# Patient Record
Sex: FEMALE | Race: WHITE | NOT HISPANIC OR LATINO | URBAN - METROPOLITAN AREA
[De-identification: names, ages, dates, MRNs, and addresses within clinical notes are randomized per-mention and may not be internally consistent; named-entity substitution may affect disease eponyms.]

---

## 2017-03-30 NOTE — PATIENT DISCUSSION
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 RECOMMENDS OMEGA-3 FISH OIL WITH [facility] APPROVAL. RETURN FOR FOLLOW-UP AS SCHEDULED OR SOONER IF SYMPTOMS WORSEN.

## 2017-03-30 NOTE — PATIENT DISCUSSION
Advanced Primary Open Angle Glaucoma Counseling: I have reviewed the regimen of glaucoma drops with the patient and have stressed the importance of compliance. Patient instructed to continue present medication and return for follow-up as scheduled.

## 2017-03-30 NOTE — PATIENT DISCUSSION
*PCF OD; BECOMING VISUALLY SIGNIFICANT BUT NOT BOTHERSOME TO PATIENT AT THIS TIME. CONTINUE TO FOLLOW FOR NOW. OFFER SPEC RX UPDATE.

## 2017-03-30 NOTE — PATIENT DISCUSSION
*POAG, OU: INTRAOCULAR PRESSURE IS WITHIN ACCEPTABLE LIMITS. PATIENT COMPLAINS OF REDNESS AND IRRITATION. STOP BRIMONIDINE TODAY. STRESSED COMPLIANCE WITH CONSISTENT DROP USE. PT INSTRUCTED TO CONTINUE WITH OTHER DROPS AS PRESCRIBED AND RETURN FOR FOLLOW-UP AS SCHEDULED.

## 2017-03-30 NOTE — PATIENT DISCUSSION
Continue: Artificial Tears (dextran 70-hypromellose): drops: 0.1-0.3% 1 drop as needed into both eyes

## 2017-03-30 NOTE — PATIENT DISCUSSION
AMD (DRY), OS:  PRESCRIBE AREDS 2 VITAMINS / AMSLER GRID DAILY / UV PROTECTION. SMOKING CESSATION EMPHASIZED. RETURN FOR FOLLOW-UP AS SCHEDULED.

## 2017-03-30 NOTE — PATIENT DISCUSSION
MACULAR DRUSEN, OD:  PRESCRIBE AREDS 2 VITAMINS AND INCREASE UV PROTECTION. STRESS GOOD DIET AND NO SMOKING. RETURN FOR FOLLOW-UP AS SCHEDULED.

## 2017-04-19 NOTE — PATIENT DISCUSSION
*POAG, OU: INTRAOCULAR PRESSURE IS WITHIN ACCEPTABLE LIMITS. STOPPED BRIMONIDINE DUE TO REDNESS AND IRRITATION. CONTINUE OTHER GLAUCOMA DROPS. STRESSED COMPLIANCE WITH CONSISTENT DROP USE. PT INSTRUCTED TO CONTINUE WITH DROPS AS PRESCRIBED AND RETURN TO SEE DR. ANDREA IN 2 MONTHS.

## 2018-01-09 ENCOUNTER — FOLLOW UP AND POST INJECTION EVALUATION (OUTPATIENT)
Dept: URBAN - METROPOLITAN AREA CLINIC 26 | Facility: CLINIC | Age: 68
End: 2018-01-09

## 2018-01-09 VITALS
SYSTOLIC BLOOD PRESSURE: 138 MMHG | HEIGHT: 64 IN | WEIGHT: 133 LBS | HEART RATE: 85 BPM | BODY MASS INDEX: 22.71 KG/M2 | DIASTOLIC BLOOD PRESSURE: 81 MMHG

## 2018-01-09 DIAGNOSIS — H02.833: ICD-10-CM

## 2018-01-09 DIAGNOSIS — H40.022: ICD-10-CM

## 2018-01-09 DIAGNOSIS — H35.3110: ICD-10-CM

## 2018-01-09 DIAGNOSIS — H02.056: ICD-10-CM

## 2018-01-09 DIAGNOSIS — H35.373: ICD-10-CM

## 2018-01-09 DIAGNOSIS — H43.811: ICD-10-CM

## 2018-01-09 DIAGNOSIS — H35.3221: ICD-10-CM

## 2018-01-09 DIAGNOSIS — H02.836: ICD-10-CM

## 2018-01-09 PROCEDURE — 92014 COMPRE OPH EXAM EST PT 1/>: CPT

## 2018-01-09 PROCEDURE — 92235 FLUORESCEIN ANGRPH MLTIFRAME: CPT

## 2018-01-09 PROCEDURE — 92134 CPTRZ OPH DX IMG PST SGM RTA: CPT

## 2018-01-09 PROCEDURE — 67028 INJECTION EYE DRUG: CPT

## 2018-01-09 ASSESSMENT — TONOMETRY
OS_IOP_MMHG: 12
OD_IOP_MMHG: 12

## 2018-01-09 ASSESSMENT — VISUAL ACUITY
OS_CC: 20/100
OD_CC: 20/20-
OS_PH: 20/40-

## 2018-02-09 ENCOUNTER — CLINIC PROCEDURE ONLY (OUTPATIENT)
Dept: URBAN - METROPOLITAN AREA CLINIC 33 | Facility: CLINIC | Age: 68
End: 2018-02-09

## 2018-02-09 DIAGNOSIS — H35.3221: ICD-10-CM

## 2018-02-09 PROCEDURE — 67028 INJECTION EYE DRUG: CPT

## 2018-02-09 ASSESSMENT — VISUAL ACUITY: OS_SC: 20/30

## 2018-02-09 ASSESSMENT — TONOMETRY: OS_IOP_MMHG: 15

## 2018-03-07 NOTE — PATIENT DISCUSSION
*POAG, OU: INTRAOCULAR PRESSURE IS WITHIN ACCEPTABLE LIMITS. EXPLAINED THE IMPORTANCE OF YEARLY VISUAL BARRIOS AT DR. Celestino Hurst OFFICE. STRESSED COMPLIANCE WITH CONSISTENT DROP USE. PT INSTRUCTED TO CONTINUE WITH DROPS AS PRESCRIBED AND RETURN FOR FOLLOW-UP AS SCHEDULED.

## 2018-03-07 NOTE — PATIENT DISCUSSION
AMD (DRY), OU: PATIENT UNABLE TO TAKE AREDS 2 BECAUSE THE PILLS ARE TOO LARGE TO SWALLOW. I RECOMMEND AMSLER GRID DAILY / UV PROTECTION. SMOKING AVOIDANCE ADVISED. RETURN FOR FOLLOW-UP AS SCHEDULED. RETURN TO DR. JEFFERY ONCE YEARLY AS SCHEDULED.

## 2018-03-16 ENCOUNTER — FOLLOW UP (OUTPATIENT)
Dept: URBAN - METROPOLITAN AREA CLINIC 33 | Facility: CLINIC | Age: 68
End: 2018-03-16

## 2018-03-16 VITALS — HEART RATE: 83 BPM | SYSTOLIC BLOOD PRESSURE: 104 MMHG | HEIGHT: 55 IN | DIASTOLIC BLOOD PRESSURE: 74 MMHG

## 2018-03-16 DIAGNOSIS — H04.123: ICD-10-CM

## 2018-03-16 DIAGNOSIS — H35.3221: ICD-10-CM

## 2018-03-16 DIAGNOSIS — H02.833: ICD-10-CM

## 2018-03-16 DIAGNOSIS — H35.3110: ICD-10-CM

## 2018-03-16 DIAGNOSIS — H02.836: ICD-10-CM

## 2018-03-16 DIAGNOSIS — H40.022: ICD-10-CM

## 2018-03-16 DIAGNOSIS — H43.811: ICD-10-CM

## 2018-03-16 DIAGNOSIS — H35.373: ICD-10-CM

## 2018-03-16 PROCEDURE — 92134 CPTRZ OPH DX IMG PST SGM RTA: CPT

## 2018-03-16 PROCEDURE — 67028 INJECTION EYE DRUG: CPT

## 2018-03-16 PROCEDURE — 92014 COMPRE OPH EXAM EST PT 1/>: CPT

## 2018-03-16 PROCEDURE — 92250 FUNDUS PHOTOGRAPHY W/I&R: CPT

## 2018-03-16 ASSESSMENT — TONOMETRY
OD_IOP_MMHG: 13
OS_IOP_MMHG: 14

## 2018-03-16 ASSESSMENT — VISUAL ACUITY
OS_SC: 20/30
OD_CC: 20/20-1

## 2018-04-19 ENCOUNTER — CLINICAL PROCEDURE AND DIAGNOSTIC TESTING ONLY (OUTPATIENT)
Dept: URBAN - METROPOLITAN AREA CLINIC 26 | Facility: CLINIC | Age: 68
End: 2018-04-19

## 2018-04-19 DIAGNOSIS — H35.3221: ICD-10-CM

## 2018-04-19 DIAGNOSIS — H35.3110: ICD-10-CM

## 2018-04-19 PROCEDURE — 67028 INJECTION EYE DRUG: CPT

## 2018-04-19 PROCEDURE — 92134 CPTRZ OPH DX IMG PST SGM RTA: CPT

## 2018-04-19 ASSESSMENT — VISUAL ACUITY: OS_CC: 20/30-1

## 2018-04-19 ASSESSMENT — TONOMETRY: OS_IOP_MMHG: 12

## 2018-05-25 ENCOUNTER — CLINICAL PROCEDURE AND DIAGNOSTIC TESTING ONLY (OUTPATIENT)
Dept: URBAN - METROPOLITAN AREA CLINIC 33 | Facility: CLINIC | Age: 68
End: 2018-05-25

## 2018-05-25 DIAGNOSIS — H35.3110: ICD-10-CM

## 2018-05-25 DIAGNOSIS — H35.3221: ICD-10-CM

## 2018-05-25 PROCEDURE — 67028 INJECTION EYE DRUG: CPT

## 2018-05-25 PROCEDURE — 92134 CPTRZ OPH DX IMG PST SGM RTA: CPT

## 2018-05-25 ASSESSMENT — TONOMETRY: OS_IOP_MMHG: 12

## 2018-05-25 ASSESSMENT — VISUAL ACUITY: OS_CC: 20/30

## 2018-07-06 ENCOUNTER — CLINICAL PROCEDURE AND DIAGNOSTIC TESTING ONLY (OUTPATIENT)
Dept: URBAN - METROPOLITAN AREA CLINIC 33 | Facility: CLINIC | Age: 68
End: 2018-07-06

## 2018-07-06 DIAGNOSIS — H35.3110: ICD-10-CM

## 2018-07-06 DIAGNOSIS — H35.3221: ICD-10-CM

## 2018-07-06 PROCEDURE — 92134 CPTRZ OPH DX IMG PST SGM RTA: CPT

## 2018-07-06 PROCEDURE — 67028 INJECTION EYE DRUG: CPT

## 2018-07-06 ASSESSMENT — VISUAL ACUITY: OS_CC: 20/30

## 2018-07-06 ASSESSMENT — TONOMETRY: OS_IOP_MMHG: 14

## 2018-08-14 ENCOUNTER — FOLLOW UP AND POST INJECTION EVALUATION (OUTPATIENT)
Dept: URBAN - METROPOLITAN AREA CLINIC 26 | Facility: CLINIC | Age: 68
End: 2018-08-14

## 2018-08-14 VITALS — HEIGHT: 55 IN | DIASTOLIC BLOOD PRESSURE: 70 MMHG | HEART RATE: 72 BPM

## 2018-08-14 DIAGNOSIS — H35.373: ICD-10-CM

## 2018-08-14 DIAGNOSIS — H04.123: ICD-10-CM

## 2018-08-14 DIAGNOSIS — H35.3110: ICD-10-CM

## 2018-08-14 DIAGNOSIS — H02.836: ICD-10-CM

## 2018-08-14 DIAGNOSIS — H35.3221: ICD-10-CM

## 2018-08-14 DIAGNOSIS — H02.833: ICD-10-CM

## 2018-08-14 DIAGNOSIS — H43.811: ICD-10-CM

## 2018-08-14 DIAGNOSIS — H40.022: ICD-10-CM

## 2018-08-14 PROCEDURE — 92014 COMPRE OPH EXAM EST PT 1/>: CPT

## 2018-08-14 PROCEDURE — 92250 FUNDUS PHOTOGRAPHY W/I&R: CPT

## 2018-08-14 PROCEDURE — 67028 INJECTION EYE DRUG: CPT

## 2018-08-14 PROCEDURE — 92134 CPTRZ OPH DX IMG PST SGM RTA: CPT

## 2018-08-14 ASSESSMENT — TONOMETRY
OD_IOP_MMHG: 15
OS_IOP_MMHG: 11

## 2018-08-14 ASSESSMENT — VISUAL ACUITY
OD_SC: 20/20
OS_SC: 20/25-1

## 2018-09-28 ENCOUNTER — FOLLOW UP AND POST INJECTION EVALUATION (OUTPATIENT)
Dept: URBAN - METROPOLITAN AREA CLINIC 33 | Facility: CLINIC | Age: 68
End: 2018-09-28

## 2018-09-28 VITALS — HEIGHT: 55 IN | DIASTOLIC BLOOD PRESSURE: 78 MMHG | SYSTOLIC BLOOD PRESSURE: 142 MMHG | HEART RATE: 76 BPM

## 2018-09-28 DIAGNOSIS — H35.3110: ICD-10-CM

## 2018-09-28 DIAGNOSIS — H43.811: ICD-10-CM

## 2018-09-28 DIAGNOSIS — H35.373: ICD-10-CM

## 2018-09-28 DIAGNOSIS — G45.3: ICD-10-CM

## 2018-09-28 DIAGNOSIS — H02.836: ICD-10-CM

## 2018-09-28 DIAGNOSIS — H02.833: ICD-10-CM

## 2018-09-28 DIAGNOSIS — H40.022: ICD-10-CM

## 2018-09-28 DIAGNOSIS — H04.123: ICD-10-CM

## 2018-09-28 DIAGNOSIS — H35.3221: ICD-10-CM

## 2018-09-28 PROCEDURE — 92014 COMPRE OPH EXAM EST PT 1/>: CPT

## 2018-09-28 PROCEDURE — 92235 FLUORESCEIN ANGRPH MLTIFRAME: CPT

## 2018-09-28 PROCEDURE — 67028 INJECTION EYE DRUG: CPT

## 2018-09-28 PROCEDURE — 92250 FUNDUS PHOTOGRAPHY W/I&R: CPT

## 2018-09-28 ASSESSMENT — TONOMETRY
OS_IOP_MMHG: 12
OD_IOP_MMHG: 12

## 2018-09-28 ASSESSMENT — VISUAL ACUITY
OD_SC: 20/20
OS_SC: 20/30-

## 2018-10-04 ENCOUNTER — ADDENDUM (OUTPATIENT)
Dept: URBAN - METROPOLITAN AREA CLINIC 26 | Facility: CLINIC | Age: 68
End: 2018-10-04

## 2018-11-09 ENCOUNTER — FOLLOW UP AND POST INJECTION EVALUATION (OUTPATIENT)
Dept: URBAN - METROPOLITAN AREA CLINIC 33 | Facility: CLINIC | Age: 68
End: 2018-11-09

## 2018-11-09 DIAGNOSIS — H35.3221: ICD-10-CM

## 2018-11-09 DIAGNOSIS — H35.3110: ICD-10-CM

## 2018-11-09 PROCEDURE — 92134 CPTRZ OPH DX IMG PST SGM RTA: CPT

## 2018-11-09 PROCEDURE — 67028 INJECTION EYE DRUG: CPT

## 2018-11-09 ASSESSMENT — TONOMETRY: OS_IOP_MMHG: 12

## 2018-11-09 ASSESSMENT — VISUAL ACUITY
OS_CC: 20/30-1
OS_SC: 20/40+2

## 2018-12-14 ENCOUNTER — CLINICAL PROCEDURE AND DIAGNOSTIC TESTING ONLY (OUTPATIENT)
Dept: URBAN - METROPOLITAN AREA CLINIC 33 | Facility: CLINIC | Age: 68
End: 2018-12-14

## 2018-12-14 DIAGNOSIS — H35.3110: ICD-10-CM

## 2018-12-14 DIAGNOSIS — H35.3221: ICD-10-CM

## 2018-12-14 PROCEDURE — 92134 CPTRZ OPH DX IMG PST SGM RTA: CPT

## 2018-12-14 PROCEDURE — 67028 INJECTION EYE DRUG: CPT

## 2018-12-14 ASSESSMENT — TONOMETRY: OS_IOP_MMHG: 13

## 2019-01-18 ENCOUNTER — FOLLOW UP AND POST INJECTION EVALUATION (OUTPATIENT)
Dept: URBAN - METROPOLITAN AREA CLINIC 33 | Facility: CLINIC | Age: 69
End: 2019-01-18

## 2019-01-18 VITALS — WEIGHT: 132 LBS | HEIGHT: 64 IN | BODY MASS INDEX: 22.53 KG/M2

## 2019-01-18 DIAGNOSIS — H35.373: ICD-10-CM

## 2019-01-18 DIAGNOSIS — G45.3: ICD-10-CM

## 2019-01-18 DIAGNOSIS — H02.836: ICD-10-CM

## 2019-01-18 DIAGNOSIS — H35.3110: ICD-10-CM

## 2019-01-18 DIAGNOSIS — H40.022: ICD-10-CM

## 2019-01-18 DIAGNOSIS — H35.3221: ICD-10-CM

## 2019-01-18 DIAGNOSIS — H04.123: ICD-10-CM

## 2019-01-18 DIAGNOSIS — H02.833: ICD-10-CM

## 2019-01-18 DIAGNOSIS — H43.811: ICD-10-CM

## 2019-01-18 PROCEDURE — 92014 COMPRE OPH EXAM EST PT 1/>: CPT

## 2019-01-18 PROCEDURE — 67028 INJECTION EYE DRUG: CPT

## 2019-01-18 PROCEDURE — 92250 FUNDUS PHOTOGRAPHY W/I&R: CPT

## 2019-01-18 PROCEDURE — 92134 CPTRZ OPH DX IMG PST SGM RTA: CPT

## 2019-01-18 ASSESSMENT — VISUAL ACUITY
OD_SC: 20/20
OS_SC: 20/30

## 2019-01-18 ASSESSMENT — TONOMETRY
OS_IOP_MMHG: 13
OD_IOP_MMHG: 15

## 2019-03-01 ENCOUNTER — CLINICAL PROCEDURE AND DIAGNOSTIC TESTING ONLY (OUTPATIENT)
Dept: URBAN - METROPOLITAN AREA CLINIC 33 | Facility: CLINIC | Age: 69
End: 2019-03-01

## 2019-03-01 DIAGNOSIS — H35.3221: ICD-10-CM

## 2019-03-01 DIAGNOSIS — H35.3110: ICD-10-CM

## 2019-03-01 PROCEDURE — 67028 INJECTION EYE DRUG: CPT

## 2019-03-01 PROCEDURE — 92134 CPTRZ OPH DX IMG PST SGM RTA: CPT

## 2019-03-01 ASSESSMENT — TONOMETRY: OS_IOP_MMHG: 13

## 2019-03-01 ASSESSMENT — VISUAL ACUITY: OS_SC: 20/30+2

## 2019-03-18 NOTE — PATIENT DISCUSSION
AMD (DRY), OU:  CONTINUE TO SEE DR. JEFFERY REGULARLY. PRESCRIBE AREDS 2 VITAMINS AND RECOMMEND UV PROTECTION. PATIENT IS AWARE OF AMSLER GRID AND HOW TO CHECK FOR PROGRESSION. SMOKING AVOIDANCE ADVISED. RETURN FOR FOLLOW-UP AS SCHEDULED.

## 2019-04-05 ENCOUNTER — CLINICAL PROCEDURE AND DIAGNOSTIC TESTING ONLY (OUTPATIENT)
Dept: URBAN - METROPOLITAN AREA CLINIC 33 | Facility: CLINIC | Age: 69
End: 2019-04-05

## 2019-04-05 DIAGNOSIS — H35.3221: ICD-10-CM

## 2019-04-05 DIAGNOSIS — H35.3110: ICD-10-CM

## 2019-04-05 PROCEDURE — 67028 INJECTION EYE DRUG: CPT

## 2019-04-05 PROCEDURE — 92134 CPTRZ OPH DX IMG PST SGM RTA: CPT

## 2019-04-05 ASSESSMENT — VISUAL ACUITY: OS_SC: 20/30

## 2019-04-05 ASSESSMENT — TONOMETRY: OS_IOP_MMHG: 11

## 2019-05-08 ENCOUNTER — CLINICAL PROCEDURE AND DIAGNOSTIC TESTING ONLY (OUTPATIENT)
Dept: URBAN - METROPOLITAN AREA CLINIC 26 | Facility: CLINIC | Age: 69
End: 2019-05-08

## 2019-05-08 DIAGNOSIS — H35.3110: ICD-10-CM

## 2019-05-08 DIAGNOSIS — H35.3221: ICD-10-CM

## 2019-05-08 PROCEDURE — 67028 INJECTION EYE DRUG: CPT

## 2019-05-08 PROCEDURE — 92134 CPTRZ OPH DX IMG PST SGM RTA: CPT

## 2019-05-08 ASSESSMENT — TONOMETRY: OS_IOP_MMHG: 13

## 2019-05-08 ASSESSMENT — VISUAL ACUITY: OS_SC: 20/30+2

## 2019-06-07 ENCOUNTER — FOLLOW UP (OUTPATIENT)
Dept: URBAN - METROPOLITAN AREA CLINIC 33 | Facility: CLINIC | Age: 69
End: 2019-06-07

## 2019-06-07 DIAGNOSIS — H02.836: ICD-10-CM

## 2019-06-07 DIAGNOSIS — H02.833: ICD-10-CM

## 2019-06-07 DIAGNOSIS — H04.123: ICD-10-CM

## 2019-06-07 DIAGNOSIS — H35.373: ICD-10-CM

## 2019-06-07 DIAGNOSIS — H43.811: ICD-10-CM

## 2019-06-07 DIAGNOSIS — G45.3: ICD-10-CM

## 2019-06-07 DIAGNOSIS — H35.3110: ICD-10-CM

## 2019-06-07 DIAGNOSIS — H40.022: ICD-10-CM

## 2019-06-07 DIAGNOSIS — H35.3221: ICD-10-CM

## 2019-06-07 PROCEDURE — 92250 FUNDUS PHOTOGRAPHY W/I&R: CPT

## 2019-06-07 PROCEDURE — 92014 COMPRE OPH EXAM EST PT 1/>: CPT

## 2019-06-07 PROCEDURE — 67028 INJECTION EYE DRUG: CPT

## 2019-06-07 PROCEDURE — 92134 CPTRZ OPH DX IMG PST SGM RTA: CPT

## 2019-06-07 ASSESSMENT — VISUAL ACUITY
OS_CC: 20/30
OD_CC: 20/20

## 2019-06-07 ASSESSMENT — TONOMETRY
OS_IOP_MMHG: 17
OD_IOP_MMHG: 16

## 2019-07-12 ENCOUNTER — CLINICAL PROCEDURE AND DIAGNOSTIC TESTING ONLY (OUTPATIENT)
Dept: URBAN - METROPOLITAN AREA CLINIC 33 | Facility: CLINIC | Age: 69
End: 2019-07-12

## 2019-07-12 DIAGNOSIS — H35.3110: ICD-10-CM

## 2019-07-12 DIAGNOSIS — H35.3221: ICD-10-CM

## 2019-07-12 PROCEDURE — 67028 INJECTION EYE DRUG: CPT

## 2019-07-12 PROCEDURE — 92134 CPTRZ OPH DX IMG PST SGM RTA: CPT

## 2019-07-12 ASSESSMENT — VISUAL ACUITY: OS_SC: 20/25-1

## 2019-07-12 ASSESSMENT — TONOMETRY: OS_IOP_MMHG: 15

## 2019-08-16 ENCOUNTER — CLINICAL PROCEDURE AND DIAGNOSTIC TESTING ONLY (OUTPATIENT)
Dept: URBAN - METROPOLITAN AREA CLINIC 33 | Facility: CLINIC | Age: 69
End: 2019-08-16

## 2019-08-16 DIAGNOSIS — H35.3110: ICD-10-CM

## 2019-08-16 DIAGNOSIS — H35.3221: ICD-10-CM

## 2019-08-16 PROCEDURE — 67028 INJECTION EYE DRUG: CPT

## 2019-08-16 PROCEDURE — 92134 CPTRZ OPH DX IMG PST SGM RTA: CPT

## 2019-08-16 ASSESSMENT — TONOMETRY: OS_IOP_MMHG: 11

## 2019-08-16 ASSESSMENT — VISUAL ACUITY: OS_SC: 20/25

## 2019-09-20 ENCOUNTER — CLINICAL PROCEDURE AND DIAGNOSTIC TESTING ONLY (OUTPATIENT)
Dept: URBAN - METROPOLITAN AREA CLINIC 33 | Facility: CLINIC | Age: 69
End: 2019-09-20

## 2019-09-20 DIAGNOSIS — H35.3221: ICD-10-CM

## 2019-09-20 DIAGNOSIS — H35.3110: ICD-10-CM

## 2019-09-20 PROCEDURE — 92134 CPTRZ OPH DX IMG PST SGM RTA: CPT

## 2019-09-20 PROCEDURE — 67028 INJECTION EYE DRUG: CPT

## 2019-09-20 ASSESSMENT — TONOMETRY: OS_IOP_MMHG: 13

## 2019-09-20 ASSESSMENT — VISUAL ACUITY: OS_CC: 20/25

## 2019-09-23 NOTE — PATIENT DISCUSSION
Convergence insufficiency : This can be due to many causes. It is often treated with vision therapy. The alignment must be stable prior to proceeding. The cause was discussed with the patient and all their questions were answered.

## 2019-09-23 NOTE — PATIENT DISCUSSION
CONVERGENCE INSUFFICIENCY WITH DIPLOPIA BECOMING SYMPTOMATIC FOR THIS PATIENT. RECOMMEND ADDING FRESNEL PRISM TO GLASSES. RETURN FOR FOLLOW-UP AS SCHEDULED.

## 2019-10-25 ENCOUNTER — FOLLOW UP AND POST INJECTION EVALUATION (OUTPATIENT)
Dept: URBAN - METROPOLITAN AREA CLINIC 33 | Facility: CLINIC | Age: 69
End: 2019-10-25

## 2019-10-25 DIAGNOSIS — H02.836: ICD-10-CM

## 2019-10-25 DIAGNOSIS — H40.022: ICD-10-CM

## 2019-10-25 DIAGNOSIS — H04.123: ICD-10-CM

## 2019-10-25 DIAGNOSIS — H43.811: ICD-10-CM

## 2019-10-25 DIAGNOSIS — H35.3110: ICD-10-CM

## 2019-10-25 DIAGNOSIS — G45.3: ICD-10-CM

## 2019-10-25 DIAGNOSIS — H02.833: ICD-10-CM

## 2019-10-25 DIAGNOSIS — H35.3221: ICD-10-CM

## 2019-10-25 DIAGNOSIS — H35.373: ICD-10-CM

## 2019-10-25 PROCEDURE — 92134 CPTRZ OPH DX IMG PST SGM RTA: CPT

## 2019-10-25 PROCEDURE — 92250 FUNDUS PHOTOGRAPHY W/I&R: CPT

## 2019-10-25 PROCEDURE — 67028 INJECTION EYE DRUG: CPT

## 2019-10-25 PROCEDURE — 92012 INTRM OPH EXAM EST PATIENT: CPT

## 2019-10-25 ASSESSMENT — TONOMETRY
OS_IOP_MMHG: 11
OD_IOP_MMHG: 13

## 2019-10-25 ASSESSMENT — VISUAL ACUITY
OS_SC: 20/25+2
OD_SC: 20/20

## 2019-12-06 ENCOUNTER — CLINICAL PROCEDURE AND DIAGNOSTIC TESTING ONLY (OUTPATIENT)
Dept: URBAN - METROPOLITAN AREA CLINIC 33 | Facility: CLINIC | Age: 69
End: 2019-12-06

## 2019-12-06 DIAGNOSIS — H35.3221: ICD-10-CM

## 2019-12-06 PROCEDURE — 92134 CPTRZ OPH DX IMG PST SGM RTA: CPT

## 2019-12-06 PROCEDURE — 92250 FUNDUS PHOTOGRAPHY W/I&R: CPT

## 2019-12-06 PROCEDURE — 67028 INJECTION EYE DRUG: CPT

## 2019-12-06 ASSESSMENT — VISUAL ACUITY: OS_SC: 20/30-2

## 2019-12-06 ASSESSMENT — TONOMETRY: OS_IOP_MMHG: 12

## 2019-12-20 NOTE — PATIENT DISCUSSION
CONVERGENCE INSUFFICIENCY WITH DIPLOPIA. PATIENT DOES NOT LIKE PRISM EVEN THOUGH IT MAKES HER VA BETTER. OK TO REMOVE FRESNEL PRISM FROM THE GLASSES. RECOMMENDED PATCHING LEFT EYE OR CLOSING LEFT EYE FOR READING SINCE PT IS NOT HAPPY WITH FRESNEL. RETURN FOR FOLLOW-UP AS SCHEDULED.

## 2020-01-10 ENCOUNTER — CLINICAL PROCEDURE AND DIAGNOSTIC TESTING ONLY (OUTPATIENT)
Dept: URBAN - METROPOLITAN AREA CLINIC 33 | Facility: CLINIC | Age: 70
End: 2020-01-10

## 2020-01-10 DIAGNOSIS — H35.373: ICD-10-CM

## 2020-01-10 DIAGNOSIS — H35.3221: ICD-10-CM

## 2020-01-10 PROCEDURE — 92250 FUNDUS PHOTOGRAPHY W/I&R: CPT

## 2020-01-10 PROCEDURE — 92134 CPTRZ OPH DX IMG PST SGM RTA: CPT

## 2020-01-10 PROCEDURE — 67028 INJECTION EYE DRUG: CPT

## 2020-01-10 ASSESSMENT — VISUAL ACUITY: OS_SC: 20/25-2

## 2020-01-10 ASSESSMENT — TONOMETRY: OS_IOP_MMHG: 13

## 2020-02-14 ENCOUNTER — CLINICAL PROCEDURE AND DIAGNOSTIC TESTING ONLY (OUTPATIENT)
Dept: URBAN - METROPOLITAN AREA CLINIC 33 | Facility: CLINIC | Age: 70
End: 2020-02-14

## 2020-02-14 DIAGNOSIS — H35.3110: ICD-10-CM

## 2020-02-14 DIAGNOSIS — H35.3221: ICD-10-CM

## 2020-02-14 PROCEDURE — 67028 INJECTION EYE DRUG: CPT

## 2020-02-14 PROCEDURE — 92134 CPTRZ OPH DX IMG PST SGM RTA: CPT

## 2020-02-14 PROCEDURE — 92250 FUNDUS PHOTOGRAPHY W/I&R: CPT

## 2020-02-14 ASSESSMENT — VISUAL ACUITY: OS_CC: 20/20-2

## 2020-02-14 ASSESSMENT — TONOMETRY: OS_IOP_MMHG: 11

## 2020-03-20 ENCOUNTER — FOLLOW UP AND POST INJECTION EVALUATION (OUTPATIENT)
Dept: URBAN - METROPOLITAN AREA CLINIC 33 | Facility: CLINIC | Age: 70
End: 2020-03-20

## 2020-03-20 VITALS — WEIGHT: 129 LBS | BODY MASS INDEX: 22.02 KG/M2 | HEIGHT: 64 IN

## 2020-03-20 DIAGNOSIS — H02.836: ICD-10-CM

## 2020-03-20 DIAGNOSIS — H43.811: ICD-10-CM

## 2020-03-20 DIAGNOSIS — H35.373: ICD-10-CM

## 2020-03-20 DIAGNOSIS — H35.3110: ICD-10-CM

## 2020-03-20 DIAGNOSIS — H04.123: ICD-10-CM

## 2020-03-20 DIAGNOSIS — G45.3: ICD-10-CM

## 2020-03-20 DIAGNOSIS — H35.3221: ICD-10-CM

## 2020-03-20 DIAGNOSIS — H02.833: ICD-10-CM

## 2020-03-20 DIAGNOSIS — H40.022: ICD-10-CM

## 2020-03-20 PROCEDURE — 67028 INJECTION EYE DRUG: CPT

## 2020-03-20 PROCEDURE — 92134 CPTRZ OPH DX IMG PST SGM RTA: CPT

## 2020-03-20 PROCEDURE — 92014 COMPRE OPH EXAM EST PT 1/>: CPT

## 2020-03-20 PROCEDURE — 92250 FUNDUS PHOTOGRAPHY W/I&R: CPT

## 2020-03-20 ASSESSMENT — VISUAL ACUITY
OS_SC: 20/20
OD_SC: 20/20-1

## 2020-03-20 ASSESSMENT — TONOMETRY
OS_IOP_MMHG: 14
OD_IOP_MMHG: 17

## 2020-04-24 ENCOUNTER — CLINICAL PROCEDURE AND DIAGNOSTIC TESTING ONLY (OUTPATIENT)
Dept: URBAN - METROPOLITAN AREA CLINIC 33 | Facility: CLINIC | Age: 70
End: 2020-04-24

## 2020-04-24 DIAGNOSIS — H35.3221: ICD-10-CM

## 2020-04-24 DIAGNOSIS — H35.373: ICD-10-CM

## 2020-04-24 DIAGNOSIS — H35.3110: ICD-10-CM

## 2020-04-24 PROCEDURE — 67028 INJECTION EYE DRUG: CPT

## 2020-04-24 PROCEDURE — 92134 CPTRZ OPH DX IMG PST SGM RTA: CPT

## 2020-04-24 PROCEDURE — 92250 FUNDUS PHOTOGRAPHY W/I&R: CPT

## 2020-04-24 ASSESSMENT — TONOMETRY: OS_IOP_MMHG: 13

## 2020-04-24 ASSESSMENT — VISUAL ACUITY: OS_SC: 20/20

## 2020-05-29 ENCOUNTER — CLINICAL PROCEDURE AND DIAGNOSTIC TESTING ONLY (OUTPATIENT)
Dept: URBAN - METROPOLITAN AREA CLINIC 33 | Facility: CLINIC | Age: 70
End: 2020-05-29

## 2020-05-29 DIAGNOSIS — H35.3110: ICD-10-CM

## 2020-05-29 DIAGNOSIS — H35.373: ICD-10-CM

## 2020-05-29 DIAGNOSIS — H35.3221: ICD-10-CM

## 2020-05-29 PROCEDURE — 92134 CPTRZ OPH DX IMG PST SGM RTA: CPT

## 2020-05-29 PROCEDURE — 92250 FUNDUS PHOTOGRAPHY W/I&R: CPT

## 2020-05-29 PROCEDURE — 67028 INJECTION EYE DRUG: CPT

## 2020-05-29 ASSESSMENT — TONOMETRY: OS_IOP_MMHG: 13

## 2020-05-29 ASSESSMENT — VISUAL ACUITY: OS_CC: 20/20

## 2020-07-02 ENCOUNTER — CLINICAL PROCEDURE AND DIAGNOSTIC TESTING ONLY (OUTPATIENT)
Dept: URBAN - METROPOLITAN AREA CLINIC 33 | Facility: CLINIC | Age: 70
End: 2020-07-02

## 2020-07-02 DIAGNOSIS — H35.3110: ICD-10-CM

## 2020-07-02 DIAGNOSIS — H35.3221: ICD-10-CM

## 2020-07-02 DIAGNOSIS — H35.373: ICD-10-CM

## 2020-07-02 PROCEDURE — 92134 CPTRZ OPH DX IMG PST SGM RTA: CPT

## 2020-07-02 PROCEDURE — 67028 INJECTION EYE DRUG: CPT

## 2020-07-02 PROCEDURE — 92250 FUNDUS PHOTOGRAPHY W/I&R: CPT

## 2020-07-02 ASSESSMENT — TONOMETRY: OS_IOP_MMHG: 11

## 2020-07-02 ASSESSMENT — VISUAL ACUITY: OS_CC: 20/25

## 2020-08-07 ENCOUNTER — FOLLOW UP AND POST INJECTION EVALUATION (OUTPATIENT)
Dept: URBAN - METROPOLITAN AREA CLINIC 33 | Facility: CLINIC | Age: 70
End: 2020-08-07

## 2020-08-07 DIAGNOSIS — H35.3221: ICD-10-CM

## 2020-08-07 DIAGNOSIS — H43.811: ICD-10-CM

## 2020-08-07 DIAGNOSIS — H35.373: ICD-10-CM

## 2020-08-07 DIAGNOSIS — H02.833: ICD-10-CM

## 2020-08-07 DIAGNOSIS — H35.3110: ICD-10-CM

## 2020-08-07 DIAGNOSIS — H04.123: ICD-10-CM

## 2020-08-07 DIAGNOSIS — H40.022: ICD-10-CM

## 2020-08-07 DIAGNOSIS — G45.3: ICD-10-CM

## 2020-08-07 DIAGNOSIS — H02.836: ICD-10-CM

## 2020-08-07 PROCEDURE — 92014 COMPRE OPH EXAM EST PT 1/>: CPT

## 2020-08-07 PROCEDURE — 67028 INJECTION EYE DRUG: CPT

## 2020-08-07 PROCEDURE — 92134 CPTRZ OPH DX IMG PST SGM RTA: CPT

## 2020-08-07 PROCEDURE — 92250 FUNDUS PHOTOGRAPHY W/I&R: CPT

## 2020-08-07 ASSESSMENT — TONOMETRY
OS_IOP_MMHG: 14
OD_IOP_MMHG: 15

## 2020-08-07 ASSESSMENT — VISUAL ACUITY
OS_CC: 20/20
OD_SC: 20/20+2

## 2020-09-11 ENCOUNTER — CLINICAL PROCEDURE AND DIAGNOSTIC TESTING ONLY (OUTPATIENT)
Dept: URBAN - METROPOLITAN AREA CLINIC 33 | Facility: CLINIC | Age: 70
End: 2020-09-11

## 2020-09-11 DIAGNOSIS — H35.3221: ICD-10-CM

## 2020-09-11 DIAGNOSIS — H35.373: ICD-10-CM

## 2020-09-11 DIAGNOSIS — H35.3110: ICD-10-CM

## 2020-09-11 PROCEDURE — 67028 INJECTION EYE DRUG: CPT

## 2020-09-11 PROCEDURE — 92134 CPTRZ OPH DX IMG PST SGM RTA: CPT

## 2020-09-11 PROCEDURE — 92250 FUNDUS PHOTOGRAPHY W/I&R: CPT

## 2020-09-11 ASSESSMENT — TONOMETRY: OS_IOP_MMHG: 15

## 2020-09-11 ASSESSMENT — VISUAL ACUITY: OS_SC: 20/30-1

## 2020-10-23 ENCOUNTER — CLINICAL PROCEDURE AND DIAGNOSTIC TESTING ONLY (OUTPATIENT)
Dept: URBAN - METROPOLITAN AREA CLINIC 33 | Facility: CLINIC | Age: 70
End: 2020-10-23

## 2020-10-23 DIAGNOSIS — H35.3221: ICD-10-CM

## 2020-10-23 DIAGNOSIS — H35.3110: ICD-10-CM

## 2020-10-23 PROCEDURE — 67028 INJECTION EYE DRUG: CPT

## 2020-10-23 PROCEDURE — 92250 FUNDUS PHOTOGRAPHY W/I&R: CPT

## 2020-10-23 PROCEDURE — 92134 CPTRZ OPH DX IMG PST SGM RTA: CPT

## 2020-10-23 ASSESSMENT — VISUAL ACUITY: OS_CC: 20/25

## 2020-10-23 ASSESSMENT — TONOMETRY: OS_IOP_MMHG: 15

## 2020-12-04 ENCOUNTER — CLINICAL PROCEDURE AND DIAGNOSTIC TESTING ONLY (OUTPATIENT)
Dept: URBAN - METROPOLITAN AREA CLINIC 33 | Facility: CLINIC | Age: 70
End: 2020-12-04

## 2020-12-04 DIAGNOSIS — H35.3110: ICD-10-CM

## 2020-12-04 DIAGNOSIS — H35.373: ICD-10-CM

## 2020-12-04 DIAGNOSIS — H35.3221: ICD-10-CM

## 2020-12-04 PROCEDURE — 92134 CPTRZ OPH DX IMG PST SGM RTA: CPT

## 2020-12-04 PROCEDURE — 67028 INJECTION EYE DRUG: CPT

## 2020-12-04 PROCEDURE — 92250 FUNDUS PHOTOGRAPHY W/I&R: CPT

## 2020-12-04 ASSESSMENT — VISUAL ACUITY: OS_CC: 20/25-2

## 2020-12-04 ASSESSMENT — TONOMETRY: OS_IOP_MMHG: 12

## 2021-01-08 ENCOUNTER — FOLLOW UP AND POST INJECTION EVALUATION (OUTPATIENT)
Dept: URBAN - METROPOLITAN AREA CLINIC 33 | Facility: CLINIC | Age: 71
End: 2021-01-08

## 2021-01-08 VITALS — WEIGHT: 134 LBS | BODY MASS INDEX: 22.88 KG/M2 | HEIGHT: 64 IN

## 2021-01-08 DIAGNOSIS — H35.3110: ICD-10-CM

## 2021-01-08 DIAGNOSIS — H35.3221: ICD-10-CM

## 2021-01-08 DIAGNOSIS — H04.123: ICD-10-CM

## 2021-01-08 DIAGNOSIS — H02.833: ICD-10-CM

## 2021-01-08 DIAGNOSIS — H40.022: ICD-10-CM

## 2021-01-08 DIAGNOSIS — H02.836: ICD-10-CM

## 2021-01-08 DIAGNOSIS — H35.373: ICD-10-CM

## 2021-01-08 DIAGNOSIS — G45.3: ICD-10-CM

## 2021-01-08 DIAGNOSIS — H43.811: ICD-10-CM

## 2021-01-08 DIAGNOSIS — H26.492: ICD-10-CM

## 2021-01-08 PROCEDURE — 92250 FUNDUS PHOTOGRAPHY W/I&R: CPT

## 2021-01-08 PROCEDURE — 92014 COMPRE OPH EXAM EST PT 1/>: CPT

## 2021-01-08 PROCEDURE — 92134 CPTRZ OPH DX IMG PST SGM RTA: CPT

## 2021-01-08 PROCEDURE — 67028 INJECTION EYE DRUG: CPT

## 2021-01-08 ASSESSMENT — TONOMETRY
OS_IOP_MMHG: 16
OD_IOP_MMHG: 16

## 2021-01-08 ASSESSMENT — VISUAL ACUITY
OD_SC: 20/20-1
OS_CC: 20/30

## 2021-02-12 ENCOUNTER — CLINICAL PROCEDURE AND DIAGNOSTIC TESTING ONLY (OUTPATIENT)
Dept: URBAN - METROPOLITAN AREA CLINIC 33 | Facility: CLINIC | Age: 71
End: 2021-02-12

## 2021-02-12 DIAGNOSIS — H35.3221: ICD-10-CM

## 2021-02-12 DIAGNOSIS — H35.3110: ICD-10-CM

## 2021-02-12 DIAGNOSIS — H35.373: ICD-10-CM

## 2021-02-12 PROCEDURE — 92250 FUNDUS PHOTOGRAPHY W/I&R: CPT

## 2021-02-12 PROCEDURE — 67028 INJECTION EYE DRUG: CPT

## 2021-02-12 PROCEDURE — 92134 CPTRZ OPH DX IMG PST SGM RTA: CPT

## 2021-02-12 ASSESSMENT — VISUAL ACUITY: OS_CC: 20/60-2

## 2021-02-12 ASSESSMENT — TONOMETRY: OS_IOP_MMHG: 13

## 2021-03-19 ENCOUNTER — CLINICAL PROCEDURE AND DIAGNOSTIC TESTING ONLY (OUTPATIENT)
Dept: URBAN - METROPOLITAN AREA CLINIC 33 | Facility: CLINIC | Age: 71
End: 2021-03-19

## 2021-03-19 DIAGNOSIS — H35.3110: ICD-10-CM

## 2021-03-19 DIAGNOSIS — H35.373: ICD-10-CM

## 2021-03-19 DIAGNOSIS — H35.3221: ICD-10-CM

## 2021-03-19 PROCEDURE — 67028 INJECTION EYE DRUG: CPT

## 2021-03-19 PROCEDURE — 92250 FUNDUS PHOTOGRAPHY W/I&R: CPT

## 2021-03-19 PROCEDURE — 92134 CPTRZ OPH DX IMG PST SGM RTA: CPT

## 2021-03-19 ASSESSMENT — VISUAL ACUITY: OS_CC: 20/60

## 2021-03-19 ASSESSMENT — TONOMETRY: OS_IOP_MMHG: 14

## 2021-04-23 ENCOUNTER — CLINICAL PROCEDURE AND DIAGNOSTIC TESTING ONLY (OUTPATIENT)
Dept: URBAN - METROPOLITAN AREA CLINIC 33 | Facility: CLINIC | Age: 71
End: 2021-04-23

## 2021-04-23 DIAGNOSIS — H35.373: ICD-10-CM

## 2021-04-23 DIAGNOSIS — H35.3110: ICD-10-CM

## 2021-04-23 DIAGNOSIS — H35.3221: ICD-10-CM

## 2021-04-23 PROCEDURE — 92250 FUNDUS PHOTOGRAPHY W/I&R: CPT

## 2021-04-23 PROCEDURE — 67028 INJECTION EYE DRUG: CPT

## 2021-04-23 PROCEDURE — 92134 CPTRZ OPH DX IMG PST SGM RTA: CPT

## 2021-04-23 ASSESSMENT — VISUAL ACUITY: OS_CC: 20/70-1

## 2021-04-23 ASSESSMENT — TONOMETRY: OS_IOP_MMHG: 18

## 2021-06-04 ENCOUNTER — FOLLOW UP AND POST INJECTION EVALUATION (OUTPATIENT)
Dept: URBAN - METROPOLITAN AREA CLINIC 33 | Facility: CLINIC | Age: 71
End: 2021-06-04

## 2021-06-04 DIAGNOSIS — H35.3110: ICD-10-CM

## 2021-06-04 DIAGNOSIS — H35.373: ICD-10-CM

## 2021-06-04 DIAGNOSIS — H43.811: ICD-10-CM

## 2021-06-04 DIAGNOSIS — G45.3: ICD-10-CM

## 2021-06-04 DIAGNOSIS — H40.022: ICD-10-CM

## 2021-06-04 DIAGNOSIS — H26.492: ICD-10-CM

## 2021-06-04 DIAGNOSIS — H35.3221: ICD-10-CM

## 2021-06-04 PROCEDURE — 67028 INJECTION EYE DRUG: CPT

## 2021-06-04 PROCEDURE — 92014 COMPRE OPH EXAM EST PT 1/>: CPT

## 2021-06-04 PROCEDURE — 92134 CPTRZ OPH DX IMG PST SGM RTA: CPT

## 2021-06-04 PROCEDURE — 92250 FUNDUS PHOTOGRAPHY W/I&R: CPT

## 2021-06-04 ASSESSMENT — VISUAL ACUITY
OD_SC: 20/20
OS_CC: 20/60-2
OS_SC: 20/200

## 2021-06-04 ASSESSMENT — TONOMETRY
OS_IOP_MMHG: 19
OD_IOP_MMHG: 16

## 2021-07-09 ENCOUNTER — CLINICAL PROCEDURE AND DIAGNOSTIC TESTING ONLY (OUTPATIENT)
Dept: URBAN - METROPOLITAN AREA CLINIC 33 | Facility: CLINIC | Age: 71
End: 2021-07-09

## 2021-07-09 DIAGNOSIS — H35.3110: ICD-10-CM

## 2021-07-09 DIAGNOSIS — H35.373: ICD-10-CM

## 2021-07-09 DIAGNOSIS — H35.3221: ICD-10-CM

## 2021-07-09 PROCEDURE — 67028 INJECTION EYE DRUG: CPT

## 2021-07-09 PROCEDURE — 92250 FUNDUS PHOTOGRAPHY W/I&R: CPT

## 2021-07-09 PROCEDURE — 92134 CPTRZ OPH DX IMG PST SGM RTA: CPT

## 2021-07-09 ASSESSMENT — TONOMETRY: OS_IOP_MMHG: 15

## 2021-07-09 ASSESSMENT — VISUAL ACUITY: OS_CC: 20/50-2

## 2021-08-13 ENCOUNTER — CLINICAL PROCEDURE AND DIAGNOSTIC TESTING ONLY (OUTPATIENT)
Dept: URBAN - METROPOLITAN AREA CLINIC 33 | Facility: CLINIC | Age: 71
End: 2021-08-13

## 2021-08-13 DIAGNOSIS — H35.3221: ICD-10-CM

## 2021-08-13 DIAGNOSIS — H35.373: ICD-10-CM

## 2021-08-13 DIAGNOSIS — H35.3110: ICD-10-CM

## 2021-08-13 PROCEDURE — 92134 CPTRZ OPH DX IMG PST SGM RTA: CPT

## 2021-08-13 PROCEDURE — 67028 INJECTION EYE DRUG: CPT

## 2021-08-13 PROCEDURE — 92250 FUNDUS PHOTOGRAPHY W/I&R: CPT

## 2021-08-13 ASSESSMENT — VISUAL ACUITY: OS_CC: 20/60+1

## 2021-08-13 ASSESSMENT — TONOMETRY: OS_IOP_MMHG: 14

## 2021-09-24 ENCOUNTER — CLINICAL PROCEDURE AND DIAGNOSTIC TESTING ONLY (OUTPATIENT)
Dept: URBAN - METROPOLITAN AREA CLINIC 33 | Facility: CLINIC | Age: 71
End: 2021-09-24

## 2021-09-24 DIAGNOSIS — H35.371: ICD-10-CM

## 2021-09-24 DIAGNOSIS — H35.3221: ICD-10-CM

## 2021-09-24 DIAGNOSIS — H35.3110: ICD-10-CM

## 2021-09-24 DIAGNOSIS — H35.372: ICD-10-CM

## 2021-09-24 PROCEDURE — 67028 INJECTION EYE DRUG: CPT

## 2021-09-24 PROCEDURE — 92134 CPTRZ OPH DX IMG PST SGM RTA: CPT

## 2021-09-24 PROCEDURE — 92250 FUNDUS PHOTOGRAPHY W/I&R: CPT

## 2021-09-24 ASSESSMENT — TONOMETRY: OS_IOP_MMHG: 18

## 2021-09-24 ASSESSMENT — VISUAL ACUITY: OS_SC: 20/200

## 2021-11-05 ENCOUNTER — FOLLOW UP AND POST INJECTION EVALUATION (OUTPATIENT)
Dept: URBAN - METROPOLITAN AREA CLINIC 33 | Facility: CLINIC | Age: 71
End: 2021-11-05

## 2021-11-05 DIAGNOSIS — H35.371: ICD-10-CM

## 2021-11-05 DIAGNOSIS — H35.3221: ICD-10-CM

## 2021-11-05 DIAGNOSIS — H43.811: ICD-10-CM

## 2021-11-05 DIAGNOSIS — H04.123: ICD-10-CM

## 2021-11-05 DIAGNOSIS — H40.022: ICD-10-CM

## 2021-11-05 DIAGNOSIS — H35.3110: ICD-10-CM

## 2021-11-05 DIAGNOSIS — G45.3: ICD-10-CM

## 2021-11-05 DIAGNOSIS — H35.372: ICD-10-CM

## 2021-11-05 PROCEDURE — 92134 CPTRZ OPH DX IMG PST SGM RTA: CPT

## 2021-11-05 PROCEDURE — 92250 FUNDUS PHOTOGRAPHY W/I&R: CPT

## 2021-11-05 PROCEDURE — 92014 COMPRE OPH EXAM EST PT 1/>: CPT

## 2021-11-05 PROCEDURE — 67028 INJECTION EYE DRUG: CPT

## 2021-11-05 ASSESSMENT — VISUAL ACUITY
OD_SC: 20/20
OS_SC: 20/400

## 2021-11-05 ASSESSMENT — TONOMETRY
OS_IOP_MMHG: 12
OD_IOP_MMHG: 13

## 2021-12-17 ENCOUNTER — CLINIC PROCEDURE ONLY (OUTPATIENT)
Dept: URBAN - METROPOLITAN AREA CLINIC 33 | Facility: CLINIC | Age: 71
End: 2021-12-17

## 2021-12-17 DIAGNOSIS — H35.3221: ICD-10-CM

## 2021-12-17 DIAGNOSIS — H35.372: ICD-10-CM

## 2021-12-17 DIAGNOSIS — H35.3110: ICD-10-CM

## 2021-12-17 DIAGNOSIS — H35.371: ICD-10-CM

## 2021-12-17 PROCEDURE — 92250 FUNDUS PHOTOGRAPHY W/I&R: CPT

## 2021-12-17 PROCEDURE — 92134 CPTRZ OPH DX IMG PST SGM RTA: CPT

## 2021-12-17 PROCEDURE — 67028 INJECTION EYE DRUG: CPT

## 2021-12-17 ASSESSMENT — VISUAL ACUITY
OS_CC: 20/25-1
OS_SC: 20/30-1

## 2021-12-17 ASSESSMENT — TONOMETRY: OS_IOP_MMHG: 16

## 2022-01-20 ENCOUNTER — CLINIC PROCEDURE ONLY (OUTPATIENT)
Dept: URBAN - METROPOLITAN AREA CLINIC 33 | Facility: CLINIC | Age: 72
End: 2022-01-20

## 2022-01-20 DIAGNOSIS — H35.3221: ICD-10-CM

## 2022-01-20 DIAGNOSIS — H35.371: ICD-10-CM

## 2022-01-20 DIAGNOSIS — H35.372: ICD-10-CM

## 2022-01-20 DIAGNOSIS — H35.3110: ICD-10-CM

## 2022-01-20 PROCEDURE — 67028 INJECTION EYE DRUG: CPT

## 2022-01-20 PROCEDURE — 92134 CPTRZ OPH DX IMG PST SGM RTA: CPT

## 2022-01-20 PROCEDURE — 92250 FUNDUS PHOTOGRAPHY W/I&R: CPT

## 2022-01-20 ASSESSMENT — VISUAL ACUITY: OS_SC: 20/25-1

## 2022-01-20 ASSESSMENT — TONOMETRY: OS_IOP_MMHG: 17

## 2022-03-03 ENCOUNTER — CLINIC PROCEDURE ONLY (OUTPATIENT)
Dept: URBAN - METROPOLITAN AREA CLINIC 33 | Facility: CLINIC | Age: 72
End: 2022-03-03

## 2022-03-03 DIAGNOSIS — H35.371: ICD-10-CM

## 2022-03-03 DIAGNOSIS — H35.372: ICD-10-CM

## 2022-03-03 DIAGNOSIS — H35.3221: ICD-10-CM

## 2022-03-03 DIAGNOSIS — H35.3110: ICD-10-CM

## 2022-03-03 PROCEDURE — 67028 INJECTION EYE DRUG: CPT

## 2022-03-03 PROCEDURE — 92134 CPTRZ OPH DX IMG PST SGM RTA: CPT

## 2022-03-03 PROCEDURE — 92250 FUNDUS PHOTOGRAPHY W/I&R: CPT

## 2022-03-03 ASSESSMENT — TONOMETRY: OS_IOP_MMHG: 16

## 2022-04-07 ENCOUNTER — CLINIC PROCEDURE ONLY (OUTPATIENT)
Dept: URBAN - METROPOLITAN AREA CLINIC 33 | Facility: CLINIC | Age: 72
End: 2022-04-07

## 2022-04-07 DIAGNOSIS — G45.3: ICD-10-CM

## 2022-04-07 DIAGNOSIS — H35.373: ICD-10-CM

## 2022-04-07 DIAGNOSIS — H35.3110: ICD-10-CM

## 2022-04-07 DIAGNOSIS — H35.3221: ICD-10-CM

## 2022-04-07 PROCEDURE — 67028 INJECTION EYE DRUG: CPT

## 2022-04-07 PROCEDURE — 92250 FUNDUS PHOTOGRAPHY W/I&R: CPT

## 2022-04-07 PROCEDURE — 92134 CPTRZ OPH DX IMG PST SGM RTA: CPT

## 2022-04-07 ASSESSMENT — TONOMETRY: OS_IOP_MMHG: 14

## 2022-05-12 ENCOUNTER — FOLLOW UP (OUTPATIENT)
Dept: URBAN - METROPOLITAN AREA CLINIC 33 | Facility: CLINIC | Age: 72
End: 2022-05-12

## 2022-05-12 VITALS — BODY MASS INDEX: 21.68 KG/M2 | WEIGHT: 127 LBS | HEIGHT: 64 IN

## 2022-05-12 DIAGNOSIS — H04.123: ICD-10-CM

## 2022-05-12 DIAGNOSIS — G45.3: ICD-10-CM

## 2022-05-12 DIAGNOSIS — H43.811: ICD-10-CM

## 2022-05-12 DIAGNOSIS — H35.373: ICD-10-CM

## 2022-05-12 DIAGNOSIS — H35.3110: ICD-10-CM

## 2022-05-12 DIAGNOSIS — H40.022: ICD-10-CM

## 2022-05-12 DIAGNOSIS — H35.3221: ICD-10-CM

## 2022-05-12 PROCEDURE — 67028 INJECTION EYE DRUG: CPT

## 2022-05-12 PROCEDURE — 92250 FUNDUS PHOTOGRAPHY W/I&R: CPT

## 2022-05-12 PROCEDURE — 92134 CPTRZ OPH DX IMG PST SGM RTA: CPT

## 2022-05-12 PROCEDURE — 92014 COMPRE OPH EXAM EST PT 1/>: CPT

## 2022-05-12 ASSESSMENT — TONOMETRY
OD_IOP_MMHG: 12
OS_IOP_MMHG: 13

## 2022-05-12 ASSESSMENT — VISUAL ACUITY
OS_SC: 20/20-2
OD_SC: 20/20

## 2022-06-04 ENCOUNTER — TELEPHONE ENCOUNTER (OUTPATIENT)
Dept: URBAN - METROPOLITAN AREA CLINIC 68 | Facility: CLINIC | Age: 72
End: 2022-06-04

## 2022-06-04 RX ORDER — LACTASE 9000 UNIT
TABLET,CHEWABLE ORAL
Qty: 30 | Refills: 0 | OUTPATIENT
Start: 2016-04-08 | End: 2017-06-20

## 2022-06-04 RX ORDER — CHOLESTYRAMINE 4 G/9G
POWDER, FOR SUSPENSION ORAL DAILY
Qty: 30 | Refills: 0 | OUTPATIENT
Start: 2017-06-20 | End: 2017-07-20

## 2022-06-04 RX ORDER — RIFAXIMIN 550 MG/1
TABLET ORAL
Qty: 42 | Refills: 0 | OUTPATIENT
Start: 2016-05-02 | End: 2016-05-27

## 2022-06-05 ENCOUNTER — TELEPHONE ENCOUNTER (OUTPATIENT)
Dept: URBAN - METROPOLITAN AREA CLINIC 68 | Facility: CLINIC | Age: 72
End: 2022-06-05

## 2022-06-05 RX ORDER — RANIBIZUMAB 10 MG/ML
LUCENTIS( 0.5MG/0.05ML INTRAOCULAR  ONCE A MONTH ) ACTIVE -HX ENTRY INJECTION, SOLUTION INTRAVITREAL
Status: ACTIVE | COMMUNITY
Start: 2017-06-27

## 2022-06-05 RX ORDER — LEVOTHYROXINE SODIUM 100 UG/1
SYNTHROID( 100MCG ORAL  DAILY ) ACTIVE -HX ENTRY TABLET ORAL DAILY
Status: ACTIVE | COMMUNITY
Start: 2017-06-27

## 2022-06-05 RX ORDER — SACCHAROMYCES BOULARDII 250 MG
CAPSULE ORAL DAILY
Qty: 1 | Refills: 0 | Status: ACTIVE | COMMUNITY
Start: 2016-05-27

## 2022-06-05 RX ORDER — ERGOCALCIFEROL 1.25 MG/1
VITAMIN D (ERGOCALCIFEROL)( 50000UNIT ORAL  ONCE A WEEK ) ACTIVE -HX ENTRY CAPSULE ORAL
Status: ACTIVE | COMMUNITY
Start: 2017-06-27

## 2022-06-14 ENCOUNTER — CLINIC PROCEDURE ONLY (OUTPATIENT)
Dept: URBAN - METROPOLITAN AREA CLINIC 33 | Facility: CLINIC | Age: 72
End: 2022-06-14

## 2022-06-14 DIAGNOSIS — H35.3110: ICD-10-CM

## 2022-06-14 DIAGNOSIS — H35.3221: ICD-10-CM

## 2022-06-14 PROCEDURE — 67028 INJECTION EYE DRUG: CPT

## 2022-06-14 PROCEDURE — 92250 FUNDUS PHOTOGRAPHY W/I&R: CPT

## 2022-06-14 PROCEDURE — 92134 CPTRZ OPH DX IMG PST SGM RTA: CPT

## 2022-06-25 ENCOUNTER — TELEPHONE ENCOUNTER (OUTPATIENT)
Age: 72
End: 2022-06-25

## 2022-06-25 RX ORDER — CHOLESTYRAMINE 4 G/9G
POWDER, FOR SUSPENSION ORAL DAILY
Qty: 30 | Refills: 0 | OUTPATIENT
Start: 2017-06-20 | End: 2017-07-20

## 2022-06-25 RX ORDER — LACTASE 9000 UNIT
TABLET,CHEWABLE ORAL
Qty: 30 | Refills: 0 | OUTPATIENT
Start: 2016-04-08 | End: 2017-06-20

## 2022-06-25 RX ORDER — RIFAXIMIN 550 MG/1
TABLET ORAL
Qty: 42 | Refills: 0 | OUTPATIENT
Start: 2016-05-02 | End: 2016-05-27

## 2022-06-26 ENCOUNTER — TELEPHONE ENCOUNTER (OUTPATIENT)
Age: 72
End: 2022-06-26

## 2022-06-26 RX ORDER — LEVOTHYROXINE SODIUM 100 MCG
SYNTHROID( 100MCG ORAL  DAILY ) ACTIVE -HX ENTRY TABLET ORAL DAILY
Status: ACTIVE | COMMUNITY
Start: 2017-06-27

## 2022-06-26 RX ORDER — SACCHAROMYCES BOULARDII 250 MG
CAPSULE ORAL DAILY
Qty: 1 | Refills: 0 | Status: ACTIVE | COMMUNITY
Start: 2016-05-27

## 2022-06-26 RX ORDER — ERGOCALCIFEROL 1.25 MG/1
VITAMIN D (ERGOCALCIFEROL)( 50000UNIT ORAL  ONCE A WEEK ) ACTIVE -HX ENTRY CAPSULE ORAL
Status: ACTIVE | COMMUNITY
Start: 2017-06-27

## 2022-07-21 ENCOUNTER — CLINIC PROCEDURE ONLY (OUTPATIENT)
Dept: URBAN - METROPOLITAN AREA CLINIC 33 | Facility: CLINIC | Age: 72
End: 2022-07-21

## 2022-07-21 DIAGNOSIS — H35.3110: ICD-10-CM

## 2022-07-21 DIAGNOSIS — H35.373: ICD-10-CM

## 2022-07-21 DIAGNOSIS — H35.3221: ICD-10-CM

## 2022-07-21 PROCEDURE — 92134 CPTRZ OPH DX IMG PST SGM RTA: CPT

## 2022-07-21 PROCEDURE — 67028 INJECTION EYE DRUG: CPT

## 2022-07-21 PROCEDURE — 92250 FUNDUS PHOTOGRAPHY W/I&R: CPT

## 2022-07-21 ASSESSMENT — TONOMETRY: OS_IOP_MMHG: 15

## 2022-08-25 ENCOUNTER — CLINIC PROCEDURE ONLY (OUTPATIENT)
Dept: URBAN - METROPOLITAN AREA CLINIC 33 | Facility: CLINIC | Age: 72
End: 2022-08-25

## 2022-08-25 DIAGNOSIS — H35.373: ICD-10-CM

## 2022-08-25 DIAGNOSIS — H35.3221: ICD-10-CM

## 2022-08-25 DIAGNOSIS — H35.3110: ICD-10-CM

## 2022-08-25 PROCEDURE — 92250 FUNDUS PHOTOGRAPHY W/I&R: CPT

## 2022-08-25 PROCEDURE — 92134 CPTRZ OPH DX IMG PST SGM RTA: CPT

## 2022-08-25 PROCEDURE — 67028 INJECTION EYE DRUG: CPT

## 2022-08-25 ASSESSMENT — TONOMETRY: OS_IOP_MMHG: 14

## 2022-10-13 ENCOUNTER — FOLLOW UP (OUTPATIENT)
Dept: URBAN - METROPOLITAN AREA CLINIC 33 | Facility: CLINIC | Age: 72
End: 2022-10-13

## 2022-10-13 VITALS — HEIGHT: 64 IN | BODY MASS INDEX: 22.02 KG/M2 | WEIGHT: 129 LBS

## 2022-10-13 DIAGNOSIS — H35.373: ICD-10-CM

## 2022-10-13 DIAGNOSIS — H43.811: ICD-10-CM

## 2022-10-13 DIAGNOSIS — H35.3221: ICD-10-CM

## 2022-10-13 DIAGNOSIS — H35.3110: ICD-10-CM

## 2022-10-13 DIAGNOSIS — H04.123: ICD-10-CM

## 2022-10-13 DIAGNOSIS — H40.022: ICD-10-CM

## 2022-10-13 DIAGNOSIS — H26.492: ICD-10-CM

## 2022-10-13 PROCEDURE — 92250 FUNDUS PHOTOGRAPHY W/I&R: CPT

## 2022-10-13 PROCEDURE — 92014 COMPRE OPH EXAM EST PT 1/>: CPT

## 2022-10-13 PROCEDURE — 67028 INJECTION EYE DRUG: CPT

## 2022-10-13 PROCEDURE — 92134 CPTRZ OPH DX IMG PST SGM RTA: CPT

## 2022-10-13 ASSESSMENT — VISUAL ACUITY
OD_SC: 20/30-1
OS_SC: 20/30

## 2022-10-13 ASSESSMENT — TONOMETRY
OS_IOP_MMHG: 11
OD_IOP_MMHG: 12

## 2022-11-17 ENCOUNTER — CLINIC PROCEDURE ONLY (OUTPATIENT)
Dept: URBAN - METROPOLITAN AREA CLINIC 33 | Facility: CLINIC | Age: 72
End: 2022-11-17

## 2022-11-17 DIAGNOSIS — H35.3110: ICD-10-CM

## 2022-11-17 DIAGNOSIS — H35.373: ICD-10-CM

## 2022-11-17 DIAGNOSIS — H35.3221: ICD-10-CM

## 2022-11-17 PROCEDURE — 67028 INJECTION EYE DRUG: CPT

## 2022-11-17 PROCEDURE — 92250 FUNDUS PHOTOGRAPHY W/I&R: CPT

## 2022-11-17 PROCEDURE — 92134 CPTRZ OPH DX IMG PST SGM RTA: CPT

## 2022-11-17 ASSESSMENT — TONOMETRY: OS_IOP_MMHG: 15

## 2022-12-15 ENCOUNTER — CLINIC PROCEDURE ONLY (OUTPATIENT)
Dept: URBAN - METROPOLITAN AREA CLINIC 33 | Facility: CLINIC | Age: 72
End: 2022-12-15

## 2022-12-15 PROCEDURE — 92250 FUNDUS PHOTOGRAPHY W/I&R: CPT

## 2022-12-15 PROCEDURE — 92134 CPTRZ OPH DX IMG PST SGM RTA: CPT

## 2022-12-15 PROCEDURE — 67028 INJECTION EYE DRUG: CPT

## 2022-12-15 ASSESSMENT — TONOMETRY: OS_IOP_MMHG: 13

## 2023-01-19 ENCOUNTER — CLINIC PROCEDURE ONLY (OUTPATIENT)
Dept: URBAN - METROPOLITAN AREA CLINIC 33 | Facility: CLINIC | Age: 73
End: 2023-01-19

## 2023-01-19 DIAGNOSIS — H35.3221: ICD-10-CM

## 2023-01-19 DIAGNOSIS — H35.3110: ICD-10-CM

## 2023-01-19 DIAGNOSIS — H35.373: ICD-10-CM

## 2023-01-19 DIAGNOSIS — G45.3: ICD-10-CM

## 2023-01-19 PROCEDURE — 92250 FUNDUS PHOTOGRAPHY W/I&R: CPT

## 2023-01-19 PROCEDURE — 92134 CPTRZ OPH DX IMG PST SGM RTA: CPT

## 2023-01-19 PROCEDURE — 67028 INJECTION EYE DRUG: CPT

## 2023-01-19 ASSESSMENT — TONOMETRY: OS_IOP_MMHG: 18

## 2023-04-27 ENCOUNTER — FOLLOW UP (OUTPATIENT)
Dept: URBAN - METROPOLITAN AREA CLINIC 33 | Facility: CLINIC | Age: 73
End: 2023-04-27

## 2023-04-27 DIAGNOSIS — H43.811: ICD-10-CM

## 2023-04-27 DIAGNOSIS — H35.3221: ICD-10-CM

## 2023-04-27 DIAGNOSIS — H40.022: ICD-10-CM

## 2023-04-27 DIAGNOSIS — G45.3: ICD-10-CM

## 2023-04-27 DIAGNOSIS — H04.123: ICD-10-CM

## 2023-04-27 DIAGNOSIS — H35.3110: ICD-10-CM

## 2023-04-27 DIAGNOSIS — H35.373: ICD-10-CM

## 2023-04-27 PROCEDURE — 92014 COMPRE OPH EXAM EST PT 1/>: CPT

## 2023-04-27 PROCEDURE — 92250 FUNDUS PHOTOGRAPHY W/I&R: CPT

## 2023-04-27 PROCEDURE — 92134 CPTRZ OPH DX IMG PST SGM RTA: CPT

## 2023-04-27 PROCEDURE — 67028 INJECTION EYE DRUG: CPT

## 2023-04-27 ASSESSMENT — TONOMETRY
OD_IOP_MMHG: 16
OS_IOP_MMHG: 17

## 2023-04-27 ASSESSMENT — VISUAL ACUITY
OS_SC: 20/20-2
OD_SC: 20/20

## 2023-06-22 ENCOUNTER — CLINIC PROCEDURE ONLY (OUTPATIENT)
Dept: URBAN - METROPOLITAN AREA CLINIC 33 | Facility: CLINIC | Age: 73
End: 2023-06-22

## 2023-06-22 DIAGNOSIS — H35.3221: ICD-10-CM

## 2023-06-22 DIAGNOSIS — H35.3110: ICD-10-CM

## 2023-06-22 DIAGNOSIS — H35.373: ICD-10-CM

## 2023-06-22 PROCEDURE — 92134 CPTRZ OPH DX IMG PST SGM RTA: CPT

## 2023-06-22 PROCEDURE — 92250 FUNDUS PHOTOGRAPHY W/I&R: CPT

## 2023-06-22 ASSESSMENT — TONOMETRY: OS_IOP_MMHG: 15

## 2023-06-29 ENCOUNTER — CLINIC PROCEDURE ONLY (OUTPATIENT)
Dept: URBAN - METROPOLITAN AREA CLINIC 33 | Facility: CLINIC | Age: 73
End: 2023-06-29

## 2023-06-29 DIAGNOSIS — H35.3221: ICD-10-CM

## 2023-06-29 PROCEDURE — 67028 INJECTION EYE DRUG: CPT

## 2023-06-29 ASSESSMENT — TONOMETRY: OS_IOP_MMHG: 20

## 2023-09-07 ENCOUNTER — COMPREHENSIVE EXAM (OUTPATIENT)
Dept: URBAN - METROPOLITAN AREA CLINIC 33 | Facility: CLINIC | Age: 73
End: 2023-09-07

## 2023-09-07 DIAGNOSIS — H35.3110: ICD-10-CM

## 2023-09-07 DIAGNOSIS — H35.373: ICD-10-CM

## 2023-09-07 DIAGNOSIS — H04.123: ICD-10-CM

## 2023-09-07 DIAGNOSIS — H35.3221: ICD-10-CM

## 2023-09-07 DIAGNOSIS — G45.3: ICD-10-CM

## 2023-09-07 DIAGNOSIS — H40.022: ICD-10-CM

## 2023-09-07 DIAGNOSIS — H43.811: ICD-10-CM

## 2023-09-07 PROCEDURE — 67028 INJECTION EYE DRUG: CPT

## 2023-09-07 PROCEDURE — 92014 COMPRE OPH EXAM EST PT 1/>: CPT

## 2023-09-07 PROCEDURE — 92134 CPTRZ OPH DX IMG PST SGM RTA: CPT

## 2023-09-07 PROCEDURE — 92250 FUNDUS PHOTOGRAPHY W/I&R: CPT

## 2023-09-07 ASSESSMENT — VISUAL ACUITY
OS_SC: 20/25
OD_SC: 20/20

## 2023-09-07 ASSESSMENT — TONOMETRY
OD_IOP_MMHG: 14
OS_IOP_MMHG: 15

## 2023-11-30 ENCOUNTER — CLINIC PROCEDURE ONLY (OUTPATIENT)
Dept: URBAN - METROPOLITAN AREA CLINIC 33 | Facility: CLINIC | Age: 73
End: 2023-11-30

## 2023-11-30 DIAGNOSIS — G45.3: ICD-10-CM

## 2023-11-30 DIAGNOSIS — H35.3221: ICD-10-CM

## 2023-11-30 DIAGNOSIS — H35.3110: ICD-10-CM

## 2023-11-30 DIAGNOSIS — H35.373: ICD-10-CM

## 2023-11-30 PROCEDURE — 92134 CPTRZ OPH DX IMG PST SGM RTA: CPT

## 2023-11-30 PROCEDURE — 92250 FUNDUS PHOTOGRAPHY W/I&R: CPT

## 2023-11-30 PROCEDURE — 67028 INJECTION EYE DRUG: CPT

## 2023-11-30 ASSESSMENT — TONOMETRY: OS_IOP_MMHG: 17

## 2024-02-08 ENCOUNTER — CLINIC PROCEDURE ONLY (OUTPATIENT)
Dept: URBAN - METROPOLITAN AREA CLINIC 33 | Facility: CLINIC | Age: 74
End: 2024-02-08

## 2024-02-08 DIAGNOSIS — H35.3221: ICD-10-CM

## 2024-02-08 DIAGNOSIS — G45.3: ICD-10-CM

## 2024-02-08 DIAGNOSIS — H35.3110: ICD-10-CM

## 2024-02-08 PROCEDURE — 92250 FUNDUS PHOTOGRAPHY W/I&R: CPT

## 2024-02-08 PROCEDURE — 67028 INJECTION EYE DRUG: CPT

## 2024-02-08 PROCEDURE — 92134 CPTRZ OPH DX IMG PST SGM RTA: CPT

## 2024-04-18 ENCOUNTER — FOLLOW UP (OUTPATIENT)
Dept: URBAN - METROPOLITAN AREA CLINIC 33 | Facility: CLINIC | Age: 74
End: 2024-04-18

## 2024-04-18 VITALS — WEIGHT: 123 LBS | HEIGHT: 64 IN | BODY MASS INDEX: 21 KG/M2

## 2024-04-18 DIAGNOSIS — H35.3110: ICD-10-CM

## 2024-04-18 DIAGNOSIS — H40.022: ICD-10-CM

## 2024-04-18 DIAGNOSIS — H35.373: ICD-10-CM

## 2024-04-18 DIAGNOSIS — H43.811: ICD-10-CM

## 2024-04-18 DIAGNOSIS — H02.836: ICD-10-CM

## 2024-04-18 DIAGNOSIS — G45.3: ICD-10-CM

## 2024-04-18 DIAGNOSIS — H04.123: ICD-10-CM

## 2024-04-18 DIAGNOSIS — H02.833: ICD-10-CM

## 2024-04-18 DIAGNOSIS — H35.3221: ICD-10-CM

## 2024-04-18 DIAGNOSIS — H26.492: ICD-10-CM

## 2024-04-18 PROCEDURE — 92134 CPTRZ OPH DX IMG PST SGM RTA: CPT

## 2024-04-18 PROCEDURE — 92014 COMPRE OPH EXAM EST PT 1/>: CPT

## 2024-04-18 PROCEDURE — 92250 FUNDUS PHOTOGRAPHY W/I&R: CPT | Mod: 59

## 2024-04-18 PROCEDURE — 67028 INJECTION EYE DRUG: CPT

## 2024-04-18 ASSESSMENT — VISUAL ACUITY
OD_SC: 20/20
OS_CC: 20/30

## 2024-04-18 ASSESSMENT — TONOMETRY
OS_IOP_MMHG: 12
OD_IOP_MMHG: 14

## 2024-07-05 ENCOUNTER — CLINIC PROCEDURE ONLY (OUTPATIENT)
Dept: URBAN - METROPOLITAN AREA CLINIC 33 | Facility: CLINIC | Age: 74
End: 2024-07-05

## 2024-07-05 DIAGNOSIS — H35.3110: ICD-10-CM

## 2024-07-05 DIAGNOSIS — G45.3: ICD-10-CM

## 2024-07-05 DIAGNOSIS — H35.3221: ICD-10-CM

## 2024-07-05 PROCEDURE — 92250 FUNDUS PHOTOGRAPHY W/I&R: CPT | Mod: 59

## 2024-07-05 PROCEDURE — 92134 CPTRZ OPH DX IMG PST SGM RTA: CPT

## 2024-07-05 PROCEDURE — 67028 INJECTION EYE DRUG: CPT

## 2024-07-05 ASSESSMENT — TONOMETRY: OS_IOP_MMHG: 14

## 2024-09-12 ENCOUNTER — CLINIC PROCEDURE ONLY (OUTPATIENT)
Dept: URBAN - METROPOLITAN AREA CLINIC 33 | Facility: CLINIC | Age: 74
End: 2024-09-12

## 2024-09-12 DIAGNOSIS — G45.3: ICD-10-CM

## 2024-09-12 DIAGNOSIS — H35.3221: ICD-10-CM

## 2024-09-12 DIAGNOSIS — H35.3110: ICD-10-CM

## 2024-09-12 PROCEDURE — 92134 CPTRZ OPH DX IMG PST SGM RTA: CPT

## 2024-09-12 PROCEDURE — 67028 INJECTION EYE DRUG: CPT

## 2024-09-12 PROCEDURE — 92250 FUNDUS PHOTOGRAPHY W/I&R: CPT | Mod: 59

## 2024-11-14 ENCOUNTER — CLINIC PROCEDURE ONLY (OUTPATIENT)
Dept: URBAN - METROPOLITAN AREA CLINIC 33 | Facility: CLINIC | Age: 74
End: 2024-11-14

## 2024-11-14 DIAGNOSIS — H35.3221: ICD-10-CM

## 2024-11-14 DIAGNOSIS — H35.3110: ICD-10-CM

## 2024-11-14 DIAGNOSIS — G45.3: ICD-10-CM

## 2024-11-14 PROCEDURE — 92134 CPTRZ OPH DX IMG PST SGM RTA: CPT

## 2024-11-14 PROCEDURE — 67028 INJECTION EYE DRUG: CPT

## 2024-11-14 PROCEDURE — 92250 FUNDUS PHOTOGRAPHY W/I&R: CPT | Mod: 59

## 2024-11-14 PROCEDURE — J2777PFS VABYSMO PFS: Mod: JZ,LT

## 2025-01-09 ENCOUNTER — CLINIC PROCEDURE ONLY (OUTPATIENT)
Age: 75
End: 2025-01-09

## 2025-01-09 DIAGNOSIS — G45.3: ICD-10-CM

## 2025-01-09 DIAGNOSIS — H35.3110: ICD-10-CM

## 2025-01-09 DIAGNOSIS — H35.3221: ICD-10-CM

## 2025-01-09 PROCEDURE — 67028 INJECTION EYE DRUG: CPT

## 2025-01-09 PROCEDURE — J2777PFS VABYSMO PFS: Mod: JZ

## 2025-01-09 PROCEDURE — 92134 CPTRZ OPH DX IMG PST SGM RTA: CPT

## 2025-01-09 PROCEDURE — 92250 FUNDUS PHOTOGRAPHY W/I&R: CPT | Mod: 59

## 2025-03-06 ENCOUNTER — CLINIC PROCEDURE ONLY (OUTPATIENT)
Age: 75
End: 2025-03-06

## 2025-03-06 DIAGNOSIS — H35.3110: ICD-10-CM

## 2025-03-06 DIAGNOSIS — G45.3: ICD-10-CM

## 2025-03-06 DIAGNOSIS — H35.3221: ICD-10-CM

## 2025-03-06 PROCEDURE — 92250 FUNDUS PHOTOGRAPHY W/I&R: CPT | Mod: 59

## 2025-03-06 PROCEDURE — 92134 CPTRZ OPH DX IMG PST SGM RTA: CPT

## 2025-03-06 PROCEDURE — J2777PFS VABYSMO PFS: Mod: JZ

## 2025-03-06 PROCEDURE — 67028 INJECTION EYE DRUG: CPT

## 2025-05-01 ENCOUNTER — COMPREHENSIVE EXAM (OUTPATIENT)
Age: 75
End: 2025-05-01

## 2025-05-01 VITALS — WEIGHT: 127 LBS | HEIGHT: 64 IN | BODY MASS INDEX: 21.68 KG/M2

## 2025-05-01 DIAGNOSIS — H02.836: ICD-10-CM

## 2025-05-01 DIAGNOSIS — H43.811: ICD-10-CM

## 2025-05-01 DIAGNOSIS — G45.3: ICD-10-CM

## 2025-05-01 DIAGNOSIS — H35.3110: ICD-10-CM

## 2025-05-01 DIAGNOSIS — H40.022: ICD-10-CM

## 2025-05-01 DIAGNOSIS — H35.373: ICD-10-CM

## 2025-05-01 DIAGNOSIS — H35.3221: ICD-10-CM

## 2025-05-01 DIAGNOSIS — H26.492: ICD-10-CM

## 2025-05-01 DIAGNOSIS — H02.833: ICD-10-CM

## 2025-05-01 DIAGNOSIS — H04.123: ICD-10-CM

## 2025-05-01 PROCEDURE — 67028 INJECTION EYE DRUG: CPT

## 2025-05-01 PROCEDURE — 92250 FUNDUS PHOTOGRAPHY W/I&R: CPT | Mod: 59

## 2025-05-01 PROCEDURE — 92014 COMPRE OPH EXAM EST PT 1/>: CPT | Mod: 25

## 2025-05-01 PROCEDURE — J2777PFS VABYSMO PFS: Mod: JZ

## 2025-05-01 PROCEDURE — 92134 CPTRZ OPH DX IMG PST SGM RTA: CPT

## 2025-05-16 ENCOUNTER — CONSULTATION/EVALUATION (OUTPATIENT)
Age: 75
End: 2025-05-16

## 2025-05-16 DIAGNOSIS — H02.834: ICD-10-CM

## 2025-05-16 DIAGNOSIS — H57.813: ICD-10-CM

## 2025-05-16 DIAGNOSIS — H02.831: ICD-10-CM

## 2025-05-16 PROCEDURE — 99214 OFFICE O/P EST MOD 30 MIN: CPT

## 2025-05-16 PROCEDURE — 92285 EXTERNAL OCULAR PHOTOGRAPHY: CPT

## 2025-05-16 PROCEDURE — 92081 LIMITED VISUAL FIELD XM: CPT

## 2025-06-03 ENCOUNTER — SURGERY/PROCEDURE (OUTPATIENT)
Age: 75
End: 2025-06-03

## 2025-06-03 DIAGNOSIS — H02.831: ICD-10-CM

## 2025-06-03 DIAGNOSIS — H02.834: ICD-10-CM

## 2025-06-03 PROCEDURE — 1582350 UPPER BLEPH PER EYE FUNCTIONAL-BILATERAL: Mod: 50

## 2025-06-04 ENCOUNTER — POST-OP (OUTPATIENT)
Age: 75
End: 2025-06-04

## 2025-06-04 DIAGNOSIS — Z98.890: ICD-10-CM

## 2025-06-04 PROCEDURE — 99024 POSTOP FOLLOW-UP VISIT: CPT

## 2025-06-11 ENCOUNTER — POST-OP (OUTPATIENT)
Age: 75
End: 2025-06-11

## 2025-06-11 DIAGNOSIS — Z98.890: ICD-10-CM

## 2025-06-11 PROCEDURE — 99024 POSTOP FOLLOW-UP VISIT: CPT

## 2025-06-26 ENCOUNTER — CLINIC PROCEDURE ONLY (OUTPATIENT)
Age: 75
End: 2025-06-26

## 2025-06-26 DIAGNOSIS — G45.3: ICD-10-CM

## 2025-06-26 DIAGNOSIS — H35.3110: ICD-10-CM

## 2025-06-26 DIAGNOSIS — H35.3221: ICD-10-CM

## 2025-06-26 PROCEDURE — 67028 INJECTION EYE DRUG: CPT

## 2025-06-26 PROCEDURE — 92134 CPTRZ OPH DX IMG PST SGM RTA: CPT

## 2025-06-26 PROCEDURE — 92250 FUNDUS PHOTOGRAPHY W/I&R: CPT | Mod: 59

## 2025-06-26 PROCEDURE — J2777PFS VABYSMO PFS: Mod: JZ

## 2025-08-21 ENCOUNTER — CLINIC PROCEDURE ONLY (OUTPATIENT)
Age: 75
End: 2025-08-21

## 2025-08-21 DIAGNOSIS — H35.3221: ICD-10-CM

## 2025-08-21 DIAGNOSIS — H35.3110: ICD-10-CM

## 2025-08-21 DIAGNOSIS — G45.3: ICD-10-CM

## 2025-08-21 PROCEDURE — 92250 FUNDUS PHOTOGRAPHY W/I&R: CPT | Mod: 59

## 2025-08-21 PROCEDURE — 92134 CPTRZ OPH DX IMG PST SGM RTA: CPT

## 2025-08-21 PROCEDURE — J2777PFS VABYSMO PFS: Mod: JZ

## 2025-08-21 PROCEDURE — 67028 INJECTION EYE DRUG: CPT
